# Patient Record
Sex: MALE | Race: ASIAN | NOT HISPANIC OR LATINO | Employment: FULL TIME | URBAN - METROPOLITAN AREA
[De-identification: names, ages, dates, MRNs, and addresses within clinical notes are randomized per-mention and may not be internally consistent; named-entity substitution may affect disease eponyms.]

---

## 2024-06-10 ENCOUNTER — TELEPHONE (OUTPATIENT)
Dept: PRIMARY CARE | Facility: CLINIC | Age: 59
End: 2024-06-10
Payer: COMMERCIAL

## 2024-06-10 NOTE — TELEPHONE ENCOUNTER
This patient is on Dr Christopher's schedule 7/1/24 at 4:45. He has not seen Dr Christopher since 2020

## 2024-07-01 ENCOUNTER — APPOINTMENT (OUTPATIENT)
Dept: PRIMARY CARE | Facility: CLINIC | Age: 59
End: 2024-07-01
Payer: COMMERCIAL

## 2024-07-01 PROBLEM — E78.5 HYPERLIPIDEMIA: Status: ACTIVE | Noted: 2024-07-01

## 2024-07-01 PROBLEM — I10 ESSENTIAL HYPERTENSION: Status: ACTIVE | Noted: 2018-02-26

## 2024-07-01 PROBLEM — R97.20 PSA ELEVATION: Status: ACTIVE | Noted: 2024-07-01

## 2024-07-01 PROBLEM — E55.9 VITAMIN D DEFICIENCY: Status: ACTIVE | Noted: 2024-07-01

## 2024-07-10 ENCOUNTER — APPOINTMENT (OUTPATIENT)
Dept: PRIMARY CARE | Facility: CLINIC | Age: 59
End: 2024-07-10
Payer: COMMERCIAL

## 2024-07-10 ENCOUNTER — LAB (OUTPATIENT)
Dept: LAB | Facility: LAB | Age: 59
End: 2024-07-10
Payer: COMMERCIAL

## 2024-07-10 VITALS
DIASTOLIC BLOOD PRESSURE: 90 MMHG | SYSTOLIC BLOOD PRESSURE: 140 MMHG | BODY MASS INDEX: 24.64 KG/M2 | WEIGHT: 157 LBS | HEART RATE: 66 BPM | OXYGEN SATURATION: 99 % | TEMPERATURE: 97.1 F | RESPIRATION RATE: 16 BRPM | HEIGHT: 67 IN

## 2024-07-10 DIAGNOSIS — I10 PRIMARY HYPERTENSION: ICD-10-CM

## 2024-07-10 DIAGNOSIS — Z12.5 SCREENING FOR PROSTATE CANCER: ICD-10-CM

## 2024-07-10 DIAGNOSIS — E78.5 HYPERLIPIDEMIA, UNSPECIFIED HYPERLIPIDEMIA TYPE: ICD-10-CM

## 2024-07-10 DIAGNOSIS — Z12.11 SCREENING FOR COLON CANCER: ICD-10-CM

## 2024-07-10 DIAGNOSIS — Z00.00 ANNUAL PHYSICAL EXAM: ICD-10-CM

## 2024-07-10 DIAGNOSIS — I10 ESSENTIAL HYPERTENSION: ICD-10-CM

## 2024-07-10 DIAGNOSIS — R97.20 PSA ELEVATION: Primary | ICD-10-CM

## 2024-07-10 DIAGNOSIS — R10.9 ABDOMINAL PAIN, UNSPECIFIED ABDOMINAL LOCATION: ICD-10-CM

## 2024-07-10 PROBLEM — K40.90 INGUINAL HERNIA OF LEFT SIDE WITHOUT OBSTRUCTION OR GANGRENE: Status: ACTIVE | Noted: 2024-07-10

## 2024-07-10 LAB
ALBUMIN SERPL BCP-MCNC: 4.4 G/DL (ref 3.4–5)
ALP SERPL-CCNC: 98 U/L (ref 33–136)
ALT SERPL W P-5'-P-CCNC: 34 U/L (ref 10–52)
ANION GAP SERPL CALC-SCNC: 10 MMOL/L (ref 10–20)
AST SERPL W P-5'-P-CCNC: 26 U/L (ref 9–39)
BILIRUB SERPL-MCNC: 0.6 MG/DL (ref 0–1.2)
BUN SERPL-MCNC: 12 MG/DL (ref 6–23)
CALCIUM SERPL-MCNC: 9.5 MG/DL (ref 8.6–10.3)
CHLORIDE SERPL-SCNC: 100 MMOL/L (ref 98–107)
CHOLEST SERPL-MCNC: 184 MG/DL (ref 0–199)
CHOLESTEROL/HDL RATIO: 4.7
CO2 SERPL-SCNC: 31 MMOL/L (ref 21–32)
CREAT SERPL-MCNC: 0.82 MG/DL (ref 0.5–1.3)
CREAT UR-MCNC: 66.5 MG/DL (ref 20–370)
EGFRCR SERPLBLD CKD-EPI 2021: >90 ML/MIN/1.73M*2
ERYTHROCYTE [DISTWIDTH] IN BLOOD BY AUTOMATED COUNT: 15.1 % (ref 11.5–14.5)
GLUCOSE SERPL-MCNC: 84 MG/DL (ref 74–99)
HCT VFR BLD AUTO: 42.4 % (ref 41–52)
HDLC SERPL-MCNC: 39.1 MG/DL
HGB BLD-MCNC: 13.6 G/DL (ref 13.5–17.5)
LDLC SERPL CALC-MCNC: 111 MG/DL
MCH RBC QN AUTO: 27.6 PG (ref 26–34)
MCHC RBC AUTO-ENTMCNC: 32.1 G/DL (ref 32–36)
MCV RBC AUTO: 86 FL (ref 80–100)
MICROALBUMIN UR-MCNC: 8.5 MG/L
MICROALBUMIN/CREAT UR: 12.8 UG/MG CREAT
NON HDL CHOLESTEROL: 145 MG/DL (ref 0–149)
NRBC BLD-RTO: 0 /100 WBCS (ref 0–0)
PLATELET # BLD AUTO: 357 X10*3/UL (ref 150–450)
POTASSIUM SERPL-SCNC: 4.2 MMOL/L (ref 3.5–5.3)
PROT SERPL-MCNC: 7.5 G/DL (ref 6.4–8.2)
PSA SERPL-MCNC: 21.89 NG/ML
RBC # BLD AUTO: 4.92 X10*6/UL (ref 4.5–5.9)
SODIUM SERPL-SCNC: 137 MMOL/L (ref 136–145)
TRIGL SERPL-MCNC: 172 MG/DL (ref 0–149)
VLDL: 34 MG/DL (ref 0–40)
WBC # BLD AUTO: 8.4 X10*3/UL (ref 4.4–11.3)

## 2024-07-10 PROCEDURE — 84153 ASSAY OF PSA TOTAL: CPT

## 2024-07-10 PROCEDURE — 1036F TOBACCO NON-USER: CPT | Performed by: PHYSICIAN ASSISTANT

## 2024-07-10 PROCEDURE — 85027 COMPLETE CBC AUTOMATED: CPT

## 2024-07-10 PROCEDURE — 80053 COMPREHEN METABOLIC PANEL: CPT

## 2024-07-10 PROCEDURE — 3080F DIAST BP >= 90 MM HG: CPT | Performed by: PHYSICIAN ASSISTANT

## 2024-07-10 PROCEDURE — 80061 LIPID PANEL: CPT

## 2024-07-10 PROCEDURE — 99396 PREV VISIT EST AGE 40-64: CPT | Performed by: PHYSICIAN ASSISTANT

## 2024-07-10 PROCEDURE — 82043 UR ALBUMIN QUANTITATIVE: CPT

## 2024-07-10 PROCEDURE — 82570 ASSAY OF URINE CREATININE: CPT

## 2024-07-10 PROCEDURE — 3077F SYST BP >= 140 MM HG: CPT | Performed by: PHYSICIAN ASSISTANT

## 2024-07-10 PROCEDURE — 36415 COLL VENOUS BLD VENIPUNCTURE: CPT

## 2024-07-10 PROCEDURE — 99214 OFFICE O/P EST MOD 30 MIN: CPT | Performed by: PHYSICIAN ASSISTANT

## 2024-07-10 ASSESSMENT — ENCOUNTER SYMPTOMS
DIARRHEA: 0
ABDOMINAL DISTENTION: 1
DIFFICULTY URINATING: 1
BLOOD IN STOOL: 0
HEMATURIA: 0
ABDOMINAL PAIN: 0
CONSTIPATION: 0

## 2024-07-10 NOTE — ASSESSMENT & PLAN NOTE
- Elevated at 140/90 today (states his SBP runs around 140-150 when he checks it at home)  - He had been offered BP meds in the past but declined them.  - He would like to continue lifestyle modifications. Encouraged to limit Na intake alongside continuing a consistent workout routine.

## 2024-07-10 NOTE — PROGRESS NOTES
Subjective   Patient ID: Jaime Lanier is a 59 y.o. male who presents for check up.    HPI     Preventive:   - Lives at home in [place] with wife (Anish)    - Employment -   - Labs:   - PSA:  - Colon CA:  - Mamm:  - PAP:   - DEXA:   - Flu:  - Shingrix:  - Pneumovax/ Prevnar:  - Td:  - COVID-19 vax:   - Lung CA screen (Smoker):   - Diet:   - Exercise:   - Sexual hx:   - Tobacco:   - Illicit drugs:   - Alcohol:   - Mood:   - Dentist visits:  - Eye exams:     Elevated PSA   - PSA was very high at 26.50 on 12/30/21    HLD   - Last cholesterol was 12/30/21 - total chol 198, , HDL 26, trigs 220     HTN  - BP today 140/90 mmHg           Review of Systems    Objective   There were no vitals taken for this visit.    Physical Exam    Assessment/Plan     Problem List Items Addressed This Visit    None

## 2024-07-10 NOTE — ASSESSMENT & PLAN NOTE
PREVENTIVE CARE SCREENING:  - Labs: DUE, ordered today   - PSA: DUE, ordered today - has been trending up   - Cologuard/ Colonoscopy: DUE, DECLINED   Vaccines:   - Shingles Vaccine (start at 50): DUE, DECLINED   - Tetanus (q10yrs): UTD   Lifestyle Modification:  - Discussed DIET -   limit snacks, processed foods, sugary and greasy foods, fast foods. Increase healthy alternatives, whole grains, fruits vegetables.  - Encouraged to take daily multivitamin.    - Discussed EXERCISE -   Recommended weight training for bone health and 30 minutes of cardiovascular exercise 5-7 days a week.  - Encouraged pt to get yearly eye and dental exams

## 2024-07-10 NOTE — ASSESSMENT & PLAN NOTE
- Left inguinal hernia - not painful or incarcerated. Pt worried about increase in size recently   - Ordered abd CT scan for further evaluation

## 2024-07-10 NOTE — ASSESSMENT & PLAN NOTE
"- Discussed significant increase in PSA with pt today using online   - He had concerns about how a prostate resection might affect his urinary system and sexual function. Recommend he see urologist and discuss further with them. Pt refused urology referral today.   - Ordered updated PSA ,  - He plans to continue his \"saw palmetto extract + pollen extract\" supplement.   - He expresses understanding of and acceptance of the risks of noncompliance   "

## 2024-07-10 NOTE — PROGRESS NOTES
"Subjective   Patient ID: Jaime Lanier is a 59 y.o. male who presents for check up and Breast Mass (Has a lump on left side groin area. Has had it for a while. 8 years ago he seen a pcp for it they told him lump is ok. This past Sunday (7/7) got bigger. He is requesting an xray to make sure everything is ok. ).    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 Taking \"saw palmetto extract + pollen extract\" to see if it improves. Would not like a urology consult at this time.  - Colon CA: Said he would like to focus on his prostate concerns then would like to focus on getting colon screening.  - Shingrix: Declined  - Td: Declined  - Diet: Eating whatever his wife cooks (not many sweets)  - Exercise: Exercising twice a week at the gym  - Sexual hx: Only with current wife  - Tobacco: Quit smoking 15 years  - Illicit drugs: None  - Alcohol: 1-2 beers a week  - Mood: Feeling generally ok, low stress  - Dentist visits: 2 times a year   - Eye exams: No eye exams (uses reading glasses)    Elevated PSA   - PSA was very high at 26.50 on 12/30/21  - Denies any pain with urination  - Sometimes can't pee but feeling the urge to pee  - In-depth discussion w/ pt using an online  about his elevating PSA.     Hernia  - Bulge noted above his groin on the L side  - Reports it has worsened over time but denies any associated pain, aching, or changes in color    HLD   - Last cholesterol was 12/30/21 - total chol 198, , HDL 26, trigs 220  - Not currently taking any medications, does not want to pursue medication " "intervention at this time and focusing on exercise/dietary changes.    HTN  - BP today 140/90 mmHg  - Takes at home and averages around 140-150  - States he was given medication for HTN last visit but that he did not follow through with them.  - He would like to continue using lifestyle modifications.      Review of Systems   Gastrointestinal:  Positive for abdominal distention. Negative for abdominal pain, blood in stool, constipation and diarrhea.   Genitourinary:  Positive for difficulty urinating. Negative for hematuria.       Objective   /90 (BP Location: Right arm, Patient Position: Sitting, BP Cuff Size: Adult)   Pulse 66   Temp 36.2 °C (97.1 °F) (Temporal)   Resp 16   Ht 1.702 m (5' 7\")   Wt 71.2 kg (157 lb)   SpO2 99%   BMI 24.59 kg/m²     Physical Exam  Constitutional:       Appearance: Normal appearance.   HENT:      Head: Normocephalic and atraumatic.      Right Ear: Tympanic membrane normal. There is no impacted cerumen.      Left Ear: Tympanic membrane normal. There is no impacted cerumen.   Eyes:      Pupils: Pupils are equal, round, and reactive to light.   Cardiovascular:      Rate and Rhythm: Normal rate and regular rhythm.      Pulses: Normal pulses.      Heart sounds: Normal heart sounds.   Pulmonary:      Effort: Pulmonary effort is normal. No respiratory distress.      Breath sounds: Normal breath sounds. No wheezing or rales.   Abdominal:      Tenderness: There is no abdominal tenderness. There is no guarding.      Hernia: A hernia is present. Hernia is present in the left inguinal area.   Neurological:      Mental Status: He is alert.   Psychiatric:         Mood and Affect: Mood normal.         Behavior: Behavior normal.         Assessment/Plan     Problem List Items Addressed This Visit       PSA elevation - Primary    Overview     - Most recent PSA was 26.50 on 12/30/21 up from 12.56 prior   - Pt decided not to follow up with urologist and has instead been taking OTC supplement " "Saw Palmetto Extract and Pollen Extract         Current Assessment & Plan     - Discussed significant increase in PSA with pt today using online   - He had concerns about how a prostate resection might affect his urinary system and sexual function. Recommend he see urologist and discuss further with them. Pt refused urology referral today.   - Ordered updated PSA ,  - He plans to continue his \"saw palmetto extract + pollen extract\" supplement.   - He expresses understanding of and acceptance of the risks of noncompliance          Hyperlipidemia    Overview     - Most recent labs: total chol 198, , HDL 26, trigs 220 (12/30/21)          Current Assessment & Plan     - OVERDUE for repeat labs, ordered today  - Encouraged dietary modification - low cholesterol diet. Encouraged regular exercise.          Relevant Orders    Lipid Panel    Primary hypertension    Current Assessment & Plan     - Elevated at 140/90 today (states his SBP runs around 140-150 when he checks it at home)  - He had been offered BP meds in the past but declined them.  - He would like to continue lifestyle modifications. Encouraged to limit Na intake alongside continuing a consistent workout routine.         Annual physical exam    Overview     - Lives in [place] with wife (Anish) and their 3 children - Severino Loera and Marlo   - Works at [employer] as a    - Tdap 8/17/16 - next due 8/2026         Current Assessment & Plan     PREVENTIVE CARE SCREENING:  - Labs: DUE, ordered today   - PSA: DUE, ordered today - has been trending up   - Cologuard/ Colonoscopy: DUE, DECLINED   Vaccines:   - Shingles Vaccine (start at 50): DUE, DECLINED   - Tetanus (q10yrs): UTD   Lifestyle Modification:  - Discussed DIET -   limit snacks, processed foods, sugary and greasy foods, fast foods. Increase healthy alternatives, whole grains, fruits vegetables.  - Encouraged to take daily multivitamin.    - Discussed EXERCISE -   Recommended weight " training for bone health and 30 minutes of cardiovascular exercise 5-7 days a week.  - Encouraged pt to get yearly eye and dental exams          Relevant Orders    Comprehensive Metabolic Panel    CBC (Completed)    Abdominal pain    Current Assessment & Plan     - Left inguinal hernia - not painful or incarcerated. Pt worried about increase in size recently   - Ordered abd CT scan for further evaluation         Relevant Orders    CT abdomen pelvis w IV contrast     Other Visit Diagnoses       Screening for colon cancer        Screening for prostate cancer        Relevant Orders    Prostate Specific Antigen, Screen            - Jean Sheridan, PA-S2    I was present with the PA student who participated in the documentation of this note. I have personally seen and re-examined the patient and performed the medical decision-making components (assessment and plan of care). I have reviewed the PA student documentation and verified the findings in the note as written with additions or exceptions as stated in the body of this note.    TANNA Cash-C

## 2024-07-10 NOTE — ASSESSMENT & PLAN NOTE
- OVERDUE for repeat labs, ordered today  - Encouraged dietary modification - low cholesterol diet. Encouraged regular exercise.

## 2024-07-24 ENCOUNTER — HOSPITAL ENCOUNTER (OUTPATIENT)
Dept: RADIOLOGY | Facility: HOSPITAL | Age: 59
Discharge: HOME | End: 2024-07-24
Payer: COMMERCIAL

## 2024-07-24 DIAGNOSIS — R10.9 ABDOMINAL PAIN, UNSPECIFIED ABDOMINAL LOCATION: ICD-10-CM

## 2024-07-24 PROCEDURE — 74177 CT ABD & PELVIS W/CONTRAST: CPT | Performed by: RADIOLOGY

## 2024-07-24 PROCEDURE — 2550000001 HC RX 255 CONTRASTS: Performed by: PHYSICIAN ASSISTANT

## 2024-07-24 PROCEDURE — 74177 CT ABD & PELVIS W/CONTRAST: CPT

## 2024-09-12 ENCOUNTER — APPOINTMENT (OUTPATIENT)
Dept: PRIMARY CARE | Facility: CLINIC | Age: 59
End: 2024-09-12
Payer: COMMERCIAL

## 2024-09-12 VITALS
SYSTOLIC BLOOD PRESSURE: 140 MMHG | DIASTOLIC BLOOD PRESSURE: 100 MMHG | HEART RATE: 80 BPM | OXYGEN SATURATION: 97 % | HEIGHT: 67 IN | RESPIRATION RATE: 18 BRPM | BODY MASS INDEX: 24.8 KG/M2 | TEMPERATURE: 97.5 F | WEIGHT: 158 LBS

## 2024-09-12 DIAGNOSIS — G89.29 CHRONIC RIGHT SHOULDER PAIN: Primary | ICD-10-CM

## 2024-09-12 DIAGNOSIS — L82.1 SEBORRHEIC KERATOSIS: ICD-10-CM

## 2024-09-12 DIAGNOSIS — M25.511 CHRONIC RIGHT SHOULDER PAIN: Primary | ICD-10-CM

## 2024-09-12 PROCEDURE — 99214 OFFICE O/P EST MOD 30 MIN: CPT | Performed by: PHYSICIAN ASSISTANT

## 2024-09-12 PROCEDURE — 3080F DIAST BP >= 90 MM HG: CPT | Performed by: PHYSICIAN ASSISTANT

## 2024-09-12 PROCEDURE — 3077F SYST BP >= 140 MM HG: CPT | Performed by: PHYSICIAN ASSISTANT

## 2024-09-12 PROCEDURE — 3008F BODY MASS INDEX DOCD: CPT | Performed by: PHYSICIAN ASSISTANT

## 2024-09-12 RX ORDER — CHOLECALCIFEROL (VITAMIN D3) 50 MCG
50 TABLET ORAL DAILY
Qty: 30 TABLET | Refills: 1 | Status: SHIPPED | OUTPATIENT
Start: 2024-09-12 | End: 2024-11-11

## 2024-09-12 NOTE — PROGRESS NOTES
"Subjective   Patient ID: Jaime Lanier \"Jaime\" is a 59 y.o. male who presents for Suspicious Skin Lesion (Pt wants a mole on his face looked at on left side under eye; has grown in size) and Shoulder Pain (Pain in right shoulder and hurts when he raises arm and laying down x 1 month. ).    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, a few decades.), also does have to reach overhead for work   - Exercise level: lifting weights twice a week (at gym once a week but has to carry blocks of metal so will exercise with this at work 4-5 days a week) - bicep curls and shoulder presses and RDLs   - Onset: 1 month ago   - Description: certain movements causing pain and when laying down to sleep it hurts as well   - still good ROM just bothersome   - Rating severity: 2-3/10, when laying down 4-5/10  - Txs tried: doesn't like to take medication for pain, pain is tolerable   - Previous injury/ surgery: no (but 4-5 years ago had some pain in bicep and took vitamins which helped)      Review of Systems   Musculoskeletal:  Positive for arthralgias (right shoulder).   Skin:         Skin lesion left cheek       Objective   BP (!) 140/100   Pulse 80   Temp 36.4 °C (97.5 °F)   Resp 18   Ht 1.702 m (5' 7\")   Wt 71.7 kg (158 lb)   SpO2 97%   BMI 24.75 kg/m²     Physical Exam  Musculoskeletal:      Right shoulder: No swelling, deformity, effusion, laceration, tenderness or crepitus. Normal range of motion. Normal strength.   Skin:     Comments: Seborrheic keratosis left cheek         Assessment/Plan     Problem List Items Addressed This Visit    None  Visit Diagnoses       Chronic right shoulder pain    -  Primary    Relevant Medications    cholecalciferol (Vitamin D3) 50 MCG (2000 " UT) tablet    glucosam-chondroitin-diet cb25 116-100 mg capsule    Other Relevant Orders    XR shoulder right 2+ views    Seborrheic keratosis        Pt bothered by it's cosmetic appearance   Recommend follow up for cryotherapy   Pt agreeable to this            RIGHT SHOULDER PAIN:   - Suspecting rotator cuff tendonitis vs. Glenohumeral arthritis   - He has worked as a / manual labor for many years and and does regular weight lifting for exercise (usually shoulder presses and bicep curls).   - Sounds like overuse injury/wear and tear  - Long discussion today educating the pt about the suspected diagnoses and tx planEncouraged rest from this and do physical therapy for rehab - pt DECLINED for now - he prefers less intervention and likes to take vitamins/supplements. He wants to know what supplements might help him with pain.   - I also offered xray of the shoulder, pt DECLINED  - Pt is also strongly opposed to any form of pain medicine wanting to only attempt natural remedies and supplements  - Will rx glucosamine and vitamin D and see how he does with these   - He was encouraged to follow up if sxs worsen, progress or should he decide to pursue further tx

## 2024-09-13 ASSESSMENT — ENCOUNTER SYMPTOMS
ARTHRALGIAS: 1
ROS SKIN COMMENTS: SKIN LESION LEFT CHEEK

## 2024-09-19 ENCOUNTER — APPOINTMENT (OUTPATIENT)
Dept: PRIMARY CARE | Facility: CLINIC | Age: 59
End: 2024-09-19
Payer: COMMERCIAL

## 2024-09-25 ENCOUNTER — APPOINTMENT (OUTPATIENT)
Dept: PRIMARY CARE | Facility: CLINIC | Age: 59
End: 2024-09-25
Payer: COMMERCIAL

## 2024-09-25 DIAGNOSIS — L82.1 SEBORRHEIC KERATOSIS: Primary | ICD-10-CM

## 2024-09-25 PROCEDURE — 99212 OFFICE O/P EST SF 10 MIN: CPT | Performed by: PHYSICIAN ASSISTANT

## 2024-09-25 NOTE — PROGRESS NOTES
"Subjective   Patient ID: Jaime Lanier \"Reid" is a 59 y.o. male who presents for Mole (Pt is here today to freeze off Seborrheic keratosis on his left cheek area. ).    HPI     Follow up for cryotherapy of SK on left cheek     Review of Systems   Skin:  Positive for rash.       Objective   There were no vitals taken for this visit.    Physical Exam  HENT:      Head:     Skin:     Findings: Lesion (seborrheic keratosis left cheek below eye) present.         Patient ID: Jaime Lanier \"Reid" is a 59 y.o. male.    Destruction of lesion    Date/Time: 9/25/2024 3:18 PM    Performed by: Bri Pardo PA-C  Authorized by: Bri Pardo PA-C    Number of Lesions: 1  Lesion 1:     Body area: head/neck    Head/neck location: L cheek    Malignancy: benign lesion      Destruction method: cryotherapy        Assessment/Plan     Problem List Items Addressed This Visit    None  Visit Diagnoses       Seborrheic keratosis    -  Primary        - Treated with two freeze-thaw cycles of cryotherapy today   - Educated about what to expect as far as healing   - Follow up in 2 weeks for reevaluation - may need second cryotherapy vs. excision (can see derm for this, he doesn't want to see plastic surgeon due to cost concerns)       "

## 2024-10-08 ENCOUNTER — APPOINTMENT (OUTPATIENT)
Dept: PRIMARY CARE | Facility: CLINIC | Age: 59
End: 2024-10-08
Payer: COMMERCIAL

## 2024-10-08 VITALS
HEART RATE: 95 BPM | WEIGHT: 158 LBS | DIASTOLIC BLOOD PRESSURE: 79 MMHG | BODY MASS INDEX: 24.8 KG/M2 | OXYGEN SATURATION: 96 % | HEIGHT: 67 IN | SYSTOLIC BLOOD PRESSURE: 137 MMHG | TEMPERATURE: 97.7 F

## 2024-10-08 DIAGNOSIS — L82.1 SEBORRHEIC KERATOSIS: Primary | ICD-10-CM

## 2024-10-08 PROCEDURE — 17110 DESTRUCTION B9 LES UP TO 14: CPT | Performed by: PHYSICIAN ASSISTANT

## 2024-10-08 PROCEDURE — 3075F SYST BP GE 130 - 139MM HG: CPT | Performed by: PHYSICIAN ASSISTANT

## 2024-10-08 PROCEDURE — 3078F DIAST BP <80 MM HG: CPT | Performed by: PHYSICIAN ASSISTANT

## 2024-10-08 PROCEDURE — 1036F TOBACCO NON-USER: CPT | Performed by: PHYSICIAN ASSISTANT

## 2024-10-08 PROCEDURE — 3008F BODY MASS INDEX DOCD: CPT | Performed by: PHYSICIAN ASSISTANT

## 2024-10-08 NOTE — PROGRESS NOTES
"Subjective   Patient ID: Jaime Lanier is a 59 y.o. male who presents for Follow-up.    HPI     Bri pt here today for a follow up for Seborrheic keratosis left cheek.  2 days ago the scab came off per pt but the lesion is still dark and raised  He tolerated the last procedure well but states the lesion is still there and he would like it refrozen for removal.   He denies complication from first cryo    Review of Systems  Constitutional: Patient denies any fever, chills, loss of appetite, or unexplained weight loss.  Cardiovascular: Denies any chest pain, shortness of breath with exertion, tachycardia, palpitations.  Respiratory: Patient denies any cough, shortness of breath, or wheezing.  Skin:  Denies any rashes or skin lesions.    Objective   /79 (BP Location: Right arm, Patient Position: Sitting)   Pulse 95   Temp 36.5 °C (97.7 °F)   Ht 1.702 m (5' 7\")   Wt 71.7 kg (158 lb)   SpO2 96%   BMI 24.75 kg/m²     Physical Exam  HENT:      Head:        Comments: 11 Units CM x .5CM SK      Gen. appearance: Alert and cooperative, no acute distress, well-developed, well-nourished male.  Neck: Supple and without adenopathy or rigidity.  There is no JVD at 90° and no carotid bruits are noted.  Cardiovascular: Heart has a regular rate and rhythm without murmur or ectopy.  Respiratory: Lungs are clear to auscultation bilaterally with good air exchange.    Procedure:  Proceedure: Cryotherapy     Consent: We discussed the risks/benefits/side effects cryotherapy at length.  Patient understands the risks of scarring and hyper/hypo-pigmentation.  Patient also understands that cryotherapy is an effective treatment but recurrences do occur and further treatment may be needed.     Method: The appropriate sized limiting device was selected and the lesion was treated for [how many] seconds per the ’s protocol.     Post procedure instructions:  Patient was instructed to clean the site 2-3 times a day and apply " bacitracin ointment.  Signs of infection were discussed.  Patient was instructed to follow up if any signs of infection develop such as increasing pain, drainage, or beefy redness.  Patient was advised that a blister may occur at the site of cryotherapy and this is not unexpected event.    Assessment/Plan   Diagnoses and all orders for this visit:  Seborrheic keratosis  -     [facility] - PCP - Established; Future  Treated with one freeze-thaw cycle (10 sec) of cryotherapy today   - Educated about what to expect as far as healing   - Follow up in 3 weeks for reevaluation - may need second cryotherapy vs. excision (can see derm for this, he doesn't want to see plastic surgeon due to cost concerns)     Patient understands that should they have testing outside   facilities that we may not receive the results and was told to call us if they have not heard from our office within a week after testing.    [facility] uses voice recognition technology for dictations. Sometimes the software misinterprets words. Please take this into account when reading this.

## 2024-10-30 ENCOUNTER — APPOINTMENT (OUTPATIENT)
Dept: PRIMARY CARE | Facility: CLINIC | Age: 59
End: 2024-10-30
Payer: COMMERCIAL

## 2025-06-11 DIAGNOSIS — Z12.11 SCREENING FOR COLORECTAL CANCER: ICD-10-CM

## 2025-06-11 DIAGNOSIS — Z12.12 SCREENING FOR COLORECTAL CANCER: ICD-10-CM

## 2025-06-26 ENCOUNTER — APPOINTMENT (OUTPATIENT)
Dept: PRIMARY CARE | Facility: CLINIC | Age: 60
End: 2025-06-26

## 2025-06-26 VITALS
WEIGHT: 165 LBS | BODY MASS INDEX: 25.9 KG/M2 | OXYGEN SATURATION: 98 % | HEIGHT: 67 IN | RESPIRATION RATE: 18 BRPM | HEART RATE: 69 BPM | DIASTOLIC BLOOD PRESSURE: 86 MMHG | SYSTOLIC BLOOD PRESSURE: 118 MMHG | TEMPERATURE: 97.6 F

## 2025-06-26 DIAGNOSIS — M67.441 GANGLION CYST OF FINGER OF RIGHT HAND: Primary | ICD-10-CM

## 2025-06-26 PROCEDURE — 3074F SYST BP LT 130 MM HG: CPT | Performed by: PHYSICIAN ASSISTANT

## 2025-06-26 PROCEDURE — 99211 OFF/OP EST MAY X REQ PHY/QHP: CPT | Performed by: PHYSICIAN ASSISTANT

## 2025-06-26 PROCEDURE — 1036F TOBACCO NON-USER: CPT | Performed by: PHYSICIAN ASSISTANT

## 2025-06-26 PROCEDURE — 3008F BODY MASS INDEX DOCD: CPT | Performed by: PHYSICIAN ASSISTANT

## 2025-06-26 PROCEDURE — 3079F DIAST BP 80-89 MM HG: CPT | Performed by: PHYSICIAN ASSISTANT

## 2025-06-26 NOTE — PROGRESS NOTES
"Subjective   Patient ID: Jaime Lanier \"Reid" is a 60 y.o. male who presents for Finger Pain (Pt here today for finger pain-first finger on right hand; when he pushes on that spot on the finger feels like a lump, tender to touch, when he holds objects its tender that has been going on for 10 days. ).    HPI     Tender in his finger   - First noticed about 10 days ago   - Aggravated by pressure   - He is right hand dominant   - Small moveable cyst     Review of Systems  Nothing concerning except as noted in the HPI    Objective   /86   Pulse 69   Temp 36.4 °C (97.6 °F)   Resp 18   Ht 1.702 m (5' 7\")   Wt 74.8 kg (165 lb)   SpO2 98%   BMI 25.84 kg/m²     Physical Exam  Musculoskeletal:      Comments: Right ring finger - small moveable nodule palpated in proximal phalynx          Assessment/Plan     Problem List Items Addressed This Visit    None  Visit Diagnoses         Ganglion cyst of finger of right hand    -  Primary        - Small tender cyst but only mildly bothersome   - Discussed ortho referral, pt prefers watchful waiting               Jaime Lanier - be aware that any referrals discussed should be placed today and tests that were ordered should result in prompt scheduling today.   If not done today-then a phone call for scheduling is expected in a timely manner (within 2 weeks).   If testing is to be done-a result should be available to patient within 2 weeks time unless otherwise specified.   You, the patient or caregiver, are responsible for making sure that what was discussed is actually scheduled and completed.  If suboptimal understanding of results of tests or referral reason-a follow up appointment with me should be made.  If above does not occur-you are to connect with us for an explanation.  "

## 2025-07-02 LAB — NONINV COLON CA DNA+OCC BLD SCRN STL QL: NEGATIVE

## 2025-08-11 ENCOUNTER — TELEPHONE (OUTPATIENT)
Dept: PRIMARY CARE | Facility: CLINIC | Age: 60
End: 2025-08-11